# Patient Record
Sex: MALE | Race: WHITE | NOT HISPANIC OR LATINO | Employment: UNEMPLOYED | URBAN - METROPOLITAN AREA
[De-identification: names, ages, dates, MRNs, and addresses within clinical notes are randomized per-mention and may not be internally consistent; named-entity substitution may affect disease eponyms.]

---

## 2023-01-20 PROBLEM — J32.9 SINUSITIS: Status: ACTIVE | Noted: 2023-01-20

## 2023-01-20 PROBLEM — K86.2 PANCREAS CYST (HHS-HCC): Status: ACTIVE | Noted: 2023-01-20

## 2023-01-20 PROBLEM — G44.009 CLUSTER HEADACHE: Status: ACTIVE | Noted: 2023-01-20

## 2023-01-20 PROBLEM — H40.9 GLAUCOMA: Status: ACTIVE | Noted: 2023-01-20

## 2023-01-20 PROBLEM — H93.19 TINNITUS: Status: ACTIVE | Noted: 2023-01-20

## 2023-01-20 PROBLEM — F17.210 NICOTINE DEPENDENCE, CIGARETTES, UNCOMPLICATED: Status: ACTIVE | Noted: 2023-01-20

## 2023-01-20 PROBLEM — K76.9 LIVER LESION, LEFT LOBE: Status: ACTIVE | Noted: 2023-01-20

## 2023-01-20 PROBLEM — F31.9 BIPOLAR DISORDER (MANIC DEPRESSION) (MULTI): Status: ACTIVE | Noted: 2023-01-20

## 2023-01-20 PROBLEM — H35.81 MACULAR EDEMA OF LEFT EYE: Status: ACTIVE | Noted: 2023-01-20

## 2023-01-20 PROBLEM — H52.13 MYOPIA WITH PRESBYOPIA OF BOTH EYES: Status: ACTIVE | Noted: 2023-01-20

## 2023-01-20 PROBLEM — R05.9 COUGH: Status: ACTIVE | Noted: 2023-01-20

## 2023-01-20 PROBLEM — F19.10 DRUG ABUSE (MULTI): Status: ACTIVE | Noted: 2023-01-20

## 2023-01-20 PROBLEM — I82.432 ACUTE DEEP VEIN THROMBOSIS (DVT) OF POPLITEAL VEIN OF LEFT LOWER EXTREMITY (MULTI): Status: ACTIVE | Noted: 2023-01-20

## 2023-01-20 PROBLEM — Z98.83 STATUS POST GLAUCOMA SURGERY: Status: ACTIVE | Noted: 2023-01-20

## 2023-01-20 PROBLEM — H16.223 KERATOCONJUNCTIVITIS SICCA OF BOTH EYES NOT DUE TO SJOGREN'S SYNDROME: Status: ACTIVE | Noted: 2023-01-20

## 2023-01-20 PROBLEM — R51.9 FACIAL PAIN: Status: ACTIVE | Noted: 2023-01-20

## 2023-01-20 PROBLEM — H40.30X0 TRAUMATIC GLAUCOMA: Status: ACTIVE | Noted: 2023-01-20

## 2023-01-20 PROBLEM — N40.0 BPH (BENIGN PROSTATIC HYPERPLASIA): Status: ACTIVE | Noted: 2023-01-20

## 2023-01-20 PROBLEM — Z96.1 BILATERAL PSEUDOPHAKIA: Status: ACTIVE | Noted: 2023-01-20

## 2023-01-20 PROBLEM — H34.8192 HISTORY OF CENTRAL RETINAL VEIN OCCLUSION (CMS-HCC): Status: ACTIVE | Noted: 2023-01-20

## 2023-01-20 PROBLEM — E78.5 HYPERLIPIDEMIA: Status: ACTIVE | Noted: 2023-01-20

## 2023-01-20 PROBLEM — H52.4 MYOPIA WITH PRESBYOPIA OF BOTH EYES: Status: ACTIVE | Noted: 2023-01-20

## 2023-01-20 PROBLEM — I73.00 RAYNAUD PHENOMENON: Status: ACTIVE | Noted: 2023-01-20

## 2023-01-20 PROBLEM — R20.0 NUMBNESS OF FACE: Status: ACTIVE | Noted: 2023-01-20

## 2023-01-20 PROBLEM — I83.819 VARICOSE VEINS WITH PAIN: Status: ACTIVE | Noted: 2023-01-20

## 2023-01-20 PROBLEM — R93.1 ELEVATED CORONARY ARTERY CALCIUM SCORE: Status: ACTIVE | Noted: 2023-01-20

## 2023-01-20 PROBLEM — M79.671 RIGHT FOOT PAIN: Status: ACTIVE | Noted: 2023-01-20

## 2023-01-20 PROBLEM — R51.9 HEADACHE: Status: ACTIVE | Noted: 2023-01-20

## 2023-01-20 PROBLEM — M79.662 PAIN OF LEFT CALF: Status: ACTIVE | Noted: 2023-01-20

## 2023-01-20 PROBLEM — R63.4 WEIGHT LOSS: Status: ACTIVE | Noted: 2023-01-20

## 2023-01-20 PROBLEM — M19.90 ARTHRITIS: Status: ACTIVE | Noted: 2023-01-20

## 2023-01-20 RX ORDER — BRIMONIDINE TARTRATE 1.5 MG/ML
1 SOLUTION/ DROPS OPHTHALMIC 2 TIMES DAILY
COMMUNITY
Start: 2018-09-20

## 2023-01-20 RX ORDER — ATORVASTATIN CALCIUM 80 MG/1
1 TABLET, FILM COATED ORAL DAILY
COMMUNITY
Start: 2018-09-10 | End: 2023-05-15

## 2023-01-20 RX ORDER — DIVALPROEX SODIUM 500 MG/1
1 TABLET, DELAYED RELEASE ORAL 2 TIMES DAILY
COMMUNITY
Start: 2019-08-16 | End: 2023-05-04

## 2023-01-20 RX ORDER — MULTIVITAMIN
TABLET ORAL
COMMUNITY

## 2023-01-20 RX ORDER — TIMOLOL MALEATE 5 MG/ML
1 SOLUTION/ DROPS OPHTHALMIC
COMMUNITY
Start: 2018-09-20

## 2023-03-07 ENCOUNTER — OFFICE VISIT (OUTPATIENT)
Dept: PRIMARY CARE | Facility: CLINIC | Age: 63
End: 2023-03-07
Payer: COMMERCIAL

## 2023-03-07 VITALS
SYSTOLIC BLOOD PRESSURE: 132 MMHG | BODY MASS INDEX: 26.09 KG/M2 | HEIGHT: 67 IN | WEIGHT: 166.2 LBS | TEMPERATURE: 97.3 F | RESPIRATION RATE: 16 BRPM | DIASTOLIC BLOOD PRESSURE: 74 MMHG | HEART RATE: 74 BPM

## 2023-03-07 DIAGNOSIS — F31.60 BIPOLAR AFFECTIVE DISORDER, CURRENT EPISODE MIXED, CURRENT EPISODE SEVERITY UNSPECIFIED (MULTI): Primary | ICD-10-CM

## 2023-03-07 DIAGNOSIS — K86.2 PANCREAS CYST (HHS-HCC): ICD-10-CM

## 2023-03-07 DIAGNOSIS — K59.00 CONSTIPATION, UNSPECIFIED CONSTIPATION TYPE: ICD-10-CM

## 2023-03-07 DIAGNOSIS — Z12.5 PROSTATE CANCER SCREENING: ICD-10-CM

## 2023-03-07 DIAGNOSIS — E78.2 MIXED HYPERLIPIDEMIA: ICD-10-CM

## 2023-03-07 PROBLEM — R05.9 COUGH: Status: RESOLVED | Noted: 2023-01-20 | Resolved: 2023-03-07

## 2023-03-07 PROCEDURE — 99213 OFFICE O/P EST LOW 20 MIN: CPT | Performed by: FAMILY MEDICINE

## 2023-03-07 ASSESSMENT — ENCOUNTER SYMPTOMS
ABDOMINAL PAIN: 1
CONSTIPATION: 1

## 2023-03-07 NOTE — PROGRESS NOTES
"Subjective   Patient ID: Romie Vargas is a 62 y.o. male who presents for Abdominal Pain and Constipation (Patient had constipation Saturday night. Had BM w-loose stool on Sunday ).    HPI     Review of Systems    Objective   /74   Pulse 74   Temp 36.3 °C (97.3 °F)   Resp 16   Ht 1.702 m (5' 7\")   Wt 75.4 kg (166 lb 3.2 oz)   BMI 26.03 kg/m²     Physical Exam    Assessment/Plan          "

## 2023-03-07 NOTE — PROGRESS NOTES
"Subjective   Patient ID: Romie Vargas is a 62 y.o. male who presents for Abdominal Pain and Constipation (Patient had constipation Saturday night. Had BM w-loose stool on Sunday ).  Wants to wait on lung cancer screening   working on cutting down on cigarettes one a month  Abdominal Pain  Associated symptoms include constipation.   Constipation  Associated symptoms include abdominal pain.        Review of Systems   Gastrointestinal:  Positive for abdominal pain and constipation.       Objective   /74   Pulse 74   Temp 36.3 °C (97.3 °F)   Resp 16   Ht 1.702 m (5' 7\")   Wt 75.4 kg (166 lb 3.2 oz)   BMI 26.03 kg/m²     Physical Exam  Vitals and nursing note reviewed.   Constitutional:       General: He is not in acute distress.     Appearance: He is not ill-appearing.   HENT:      Head: Normocephalic and atraumatic.      Mouth/Throat:      Mouth: Mucous membranes are moist.   Eyes:      Conjunctiva/sclera: Conjunctivae normal.   Cardiovascular:      Rate and Rhythm: Normal rate and regular rhythm.      Heart sounds: Normal heart sounds.   Pulmonary:      Effort: Pulmonary effort is normal.      Breath sounds: Normal breath sounds.   Skin:     General: Skin is warm.   Neurological:      Mental Status: He is alert.   Psychiatric:         Mood and Affect: Mood normal.         Thought Content: Thought content normal.         Judgment: Judgment normal.         Assessment/Plan   Diagnoses and all orders for this visit:  Bipolar affective disorder, current episode mixed, current episode severity unspecified (CMS/HCC)  -     Follow Up In [facility] - PCP; Future  Mixed hyperlipidemia  -     CBC  -     Comprehensive Metabolic Panel  -     Lipid Panel  Pancreas cyst  Prostate cancer screening  -     Prostate Specific Antigen  Constipation, unspecified constipation type  Comments:  discussed fiber and increased water.  recent colonscopy last year         "

## 2023-05-04 DIAGNOSIS — F31.9 BIPOLAR DISORDER, UNSPECIFIED (MULTI): ICD-10-CM

## 2023-05-04 RX ORDER — DIVALPROEX SODIUM 500 MG/1
TABLET, DELAYED RELEASE ORAL
Qty: 180 TABLET | Refills: 1 | Status: SHIPPED | OUTPATIENT
Start: 2023-05-04 | End: 2023-10-30

## 2023-05-13 DIAGNOSIS — E78.5 HYPERLIPIDEMIA, UNSPECIFIED: ICD-10-CM

## 2023-05-15 RX ORDER — ATORVASTATIN CALCIUM 80 MG/1
TABLET, FILM COATED ORAL
Qty: 90 TABLET | Refills: 2 | Status: SHIPPED | OUTPATIENT
Start: 2023-05-15 | End: 2024-02-15

## 2023-08-16 ENCOUNTER — APPOINTMENT (OUTPATIENT)
Dept: LAB | Facility: LAB | Age: 63
End: 2023-08-16
Payer: COMMERCIAL

## 2023-08-16 LAB
ALANINE AMINOTRANSFERASE (SGPT) (U/L) IN SER/PLAS: 26 U/L (ref 10–52)
ALBUMIN (G/DL) IN SER/PLAS: 4.2 G/DL (ref 3.4–5)
ALKALINE PHOSPHATASE (U/L) IN SER/PLAS: 68 U/L (ref 33–136)
ANION GAP IN SER/PLAS: 11 MMOL/L (ref 10–20)
ASPARTATE AMINOTRANSFERASE (SGOT) (U/L) IN SER/PLAS: 21 U/L (ref 9–39)
BILIRUBIN TOTAL (MG/DL) IN SER/PLAS: 0.6 MG/DL (ref 0–1.2)
CALCIUM (MG/DL) IN SER/PLAS: 9.6 MG/DL (ref 8.6–10.3)
CARBON DIOXIDE, TOTAL (MMOL/L) IN SER/PLAS: 28 MMOL/L (ref 21–32)
CHLORIDE (MMOL/L) IN SER/PLAS: 101 MMOL/L (ref 98–107)
CHOLESTEROL (MG/DL) IN SER/PLAS: 129 MG/DL (ref 0–199)
CHOLESTEROL IN HDL (MG/DL) IN SER/PLAS: 44.5 MG/DL
CHOLESTEROL/HDL RATIO: 2.9
CREATININE (MG/DL) IN SER/PLAS: 1.03 MG/DL (ref 0.5–1.3)
ERYTHROCYTE DISTRIBUTION WIDTH (RATIO) BY AUTOMATED COUNT: 12.7 % (ref 11.5–14.5)
ERYTHROCYTE MEAN CORPUSCULAR HEMOGLOBIN CONCENTRATION (G/DL) BY AUTOMATED: 34.5 G/DL (ref 32–36)
ERYTHROCYTE MEAN CORPUSCULAR VOLUME (FL) BY AUTOMATED COUNT: 90 FL (ref 80–100)
ERYTHROCYTES (10*6/UL) IN BLOOD BY AUTOMATED COUNT: 4.97 X10E12/L (ref 4.5–5.9)
GFR MALE: 81 ML/MIN/1.73M2
GLUCOSE (MG/DL) IN SER/PLAS: 100 MG/DL (ref 74–99)
HEMATOCRIT (%) IN BLOOD BY AUTOMATED COUNT: 44.9 % (ref 41–52)
HEMOGLOBIN (G/DL) IN BLOOD: 15.5 G/DL (ref 13.5–17.5)
LDL: 67 MG/DL (ref 0–99)
LEUKOCYTES (10*3/UL) IN BLOOD BY AUTOMATED COUNT: 8.7 X10E9/L (ref 4.4–11.3)
NRBC (PER 100 WBCS) BY AUTOMATED COUNT: 0 /100 WBC (ref 0–0)
PLATELETS (10*3/UL) IN BLOOD AUTOMATED COUNT: 254 X10E9/L (ref 150–450)
POTASSIUM (MMOL/L) IN SER/PLAS: 4.3 MMOL/L (ref 3.5–5.3)
PROSTATE SPECIFIC AG (NG/ML) IN SER/PLAS: 0.99 NG/ML (ref 0–4)
PROTEIN TOTAL: 6.6 G/DL (ref 6.4–8.2)
SODIUM (MMOL/L) IN SER/PLAS: 136 MMOL/L (ref 136–145)
TRIGLYCERIDE (MG/DL) IN SER/PLAS: 89 MG/DL (ref 0–149)
UREA NITROGEN (MG/DL) IN SER/PLAS: 12 MG/DL (ref 6–23)
VLDL: 18 MG/DL (ref 0–40)

## 2023-08-29 ENCOUNTER — OFFICE VISIT (OUTPATIENT)
Dept: PRIMARY CARE | Facility: CLINIC | Age: 63
End: 2023-08-29
Payer: COMMERCIAL

## 2023-08-29 VITALS
RESPIRATION RATE: 16 BRPM | HEIGHT: 67 IN | TEMPERATURE: 97.5 F | WEIGHT: 167.2 LBS | SYSTOLIC BLOOD PRESSURE: 128 MMHG | HEART RATE: 78 BPM | DIASTOLIC BLOOD PRESSURE: 76 MMHG | BODY MASS INDEX: 26.24 KG/M2

## 2023-08-29 DIAGNOSIS — Z00.00 ROUTINE GENERAL MEDICAL EXAMINATION AT A HEALTH CARE FACILITY: Primary | ICD-10-CM

## 2023-08-29 DIAGNOSIS — F31.60 BIPOLAR AFFECTIVE DISORDER, CURRENT EPISODE MIXED, CURRENT EPISODE SEVERITY UNSPECIFIED (MULTI): ICD-10-CM

## 2023-08-29 PROCEDURE — 99396 PREV VISIT EST AGE 40-64: CPT | Performed by: FAMILY MEDICINE

## 2023-08-29 NOTE — PROGRESS NOTES
"Subjective   Patient ID: Lewis Vargas is a 63 y.o. male who presents for Annual Exam (Patients BW results in chart for review ).    Subjective  Reason for Visit: Romie Vargas is an 63 y.o. male here for a Wellness visit.   Any problems: No  Less 20 pack year history down to 10 cigarettes a day   Past Medical, Surgical, and Family History reviewed and updated in chart.    Reviewed all medications by prescribing practitioner or clinical pharmacist (such as prescriptions, OTCs, herbal therapies and supplements) and documented in the medical record.    Colon cancer screening: yes  Prostate cancer screening: Yes}  Dentist: Yes  Vitamins: Yes  Exercise: Yes, describe: motorcycle walk  Dermatologist: No  OptometristYes:   Immunizations up to date: No, Describe will get when on Medicare           Review of Systems    Objective   /76   Pulse 78   Temp 36.4 °C (97.5 °F)   Resp 16   Ht 1.702 m (5' 7\")   Wt 75.8 kg (167 lb 3.2 oz)   BMI 26.19 kg/m²     Physical Exam  Vitals and nursing note reviewed.   Constitutional:       General: He is not in acute distress.     Appearance: Normal appearance.   HENT:      Head: Normocephalic and atraumatic.      Right Ear: Tympanic membrane, ear canal and external ear normal.      Left Ear: Tympanic membrane, ear canal and external ear normal.      Nose: Nose normal.      Mouth/Throat:      Mouth: Mucous membranes are moist.      Pharynx: Oropharynx is clear.   Eyes:      Conjunctiva/sclera: Conjunctivae normal.   Neck:      Thyroid: No thyroid mass or thyromegaly.   Cardiovascular:      Rate and Rhythm: Normal rate and regular rhythm.      Pulses: Normal pulses.      Heart sounds: Normal heart sounds.   Pulmonary:      Effort: Pulmonary effort is normal.      Breath sounds: Normal breath sounds.   Abdominal:      General: Abdomen is flat. There is no distension.      Palpations: Abdomen is soft. There is no mass.      Tenderness: There is no abdominal tenderness.      " Hernia: There is no hernia in the left inguinal area or right inguinal area.   Genitourinary:     Penis: Normal.       Testes: Normal.      Epididymis:      Right: Normal.      Left: Normal.      Prostate: Normal.   Musculoskeletal:         General: Normal range of motion.      Cervical back: Normal range of motion and neck supple.   Lymphadenopathy:      Cervical: No cervical adenopathy.      Lower Body: No right inguinal adenopathy. No left inguinal adenopathy.   Skin:     General: Skin is warm.      Findings: No rash.   Neurological:      General: No focal deficit present.      Mental Status: He is alert and oriented to person, place, and time.   Psychiatric:         Mood and Affect: Mood normal.         Behavior: Behavior normal.         Thought Content: Thought content normal.         Judgment: Judgment normal.         Assessment/Plan   Problem List Items Addressed This Visit       Bipolar disorder (manic depression) (CMS/[facility])     Other Visit Diagnoses       Routine general medical examination at a health care facility    -  Primary    Relevant Orders    Follow Up In [facility] - PCP - Health Maintenance

## 2023-10-28 DIAGNOSIS — F31.9 BIPOLAR DISORDER, UNSPECIFIED (MULTI): ICD-10-CM

## 2023-10-30 RX ORDER — DIVALPROEX SODIUM 500 MG/1
500 TABLET, DELAYED RELEASE ORAL 2 TIMES DAILY
Qty: 180 TABLET | Refills: 1 | Status: SHIPPED | OUTPATIENT
Start: 2023-10-30 | End: 2024-04-25

## 2024-02-15 DIAGNOSIS — E78.5 HYPERLIPIDEMIA, UNSPECIFIED: ICD-10-CM

## 2024-02-15 RX ORDER — ATORVASTATIN CALCIUM 80 MG/1
TABLET, FILM COATED ORAL
Qty: 90 TABLET | Refills: 2 | Status: SHIPPED | OUTPATIENT
Start: 2024-02-15

## 2024-04-23 ENCOUNTER — OFFICE VISIT (OUTPATIENT)
Dept: CARDIOLOGY | Facility: CLINIC | Age: 64
End: 2024-04-23
Payer: COMMERCIAL

## 2024-04-23 VITALS
HEART RATE: 68 BPM | HEIGHT: 67 IN | OXYGEN SATURATION: 98 % | BODY MASS INDEX: 25.58 KG/M2 | SYSTOLIC BLOOD PRESSURE: 130 MMHG | DIASTOLIC BLOOD PRESSURE: 80 MMHG | WEIGHT: 163 LBS

## 2024-04-23 DIAGNOSIS — R93.1 ELEVATED CORONARY ARTERY CALCIUM SCORE: Primary | ICD-10-CM

## 2024-04-23 DIAGNOSIS — R94.31 ABNORMAL EKG: ICD-10-CM

## 2024-04-23 DIAGNOSIS — E78.5 HYPERLIPIDEMIA, UNSPECIFIED HYPERLIPIDEMIA TYPE: ICD-10-CM

## 2024-04-23 PROCEDURE — 99214 OFFICE O/P EST MOD 30 MIN: CPT | Performed by: INTERNAL MEDICINE

## 2024-04-23 PROCEDURE — 93000 ELECTROCARDIOGRAM COMPLETE: CPT | Performed by: INTERNAL MEDICINE

## 2024-04-23 NOTE — PROGRESS NOTES
"Chief Complaint:   No chief complaint on file.     History Of Present Illness:    Romie Vargas \"Shiv" is a 63 y.o. male with a history of dyslipidemia, abnormal ECG (nonspecific inferolateral ST/T changes), and elevated coronary calcium score here for follow-up.     He continues to walk every day. Denies any exertional CP or SOB.    Is still smoking about 1/2 pack of cigarettes daily.  Is still trying to cut down.    He wore a Guardian's hat.  He tells me that he is not a fan but his wife is a huge fan.    He told me that he was driving to [place] walk[place].  He had a bump in the road and now his tires are making a noise.  He just had new tires placed last week.    He is getting ready to go onto Medicare.    Lexiscan nuclear stress test 5/22/2023: No evidence of ischemia or scarring.  EF 62%.     CT coronary calcium score 9/17/2021: Total score 1145.   LM 17    LCx 4.5        Past Medical History:  He has a past medical history of Bipolar disorder, unspecified (Multi) (02/23/2022), Bipolar disorder, unspecified (Multi) (02/23/2022), Bipolar disorder, unspecified (Multi) (02/23/2022), Cluster headache syndrome, unspecified, not intractable (06/03/2021), Glaucoma secondary to eye trauma, left eye, stage unspecified (06/16/2021), Glaucoma secondary to eye trauma, unspecified eye, stage unspecified (02/25/2021), Personal history of other diseases of the nervous system and sense organs (07/22/2019), Unspecified glaucoma (02/25/2021), Unspecified glaucoma (02/25/2021), and Unspecified osteoarthritis, unspecified site (01/13/2021).    Past Surgical History:  He has a past surgical history that includes Other surgical history (07/22/2019); Other surgical history (07/22/2019); and Other surgical history (10/20/2021).      Social History:  He reports that he has been smoking cigarettes. He has never used smokeless tobacco. He reports that he does not currently use alcohol. He reports that he " "does not use drugs.    Family History:  Family History   Problem Relation Name Age of Onset    Other (diabetes mellitus) Mother      Dementia Father      Other (cardiac disorder) Father      Other (malignant neoplasm of stomach) Father          Allergies:  Patient has no known allergies.    Outpatient Medications:  Current Outpatient Medications   Medication Instructions    atorvastatin (Lipitor) 80 mg tablet TAKE 1 TABLET BY MOUTH EVERY DAY    brimonidine (AlphaGAN P) 0.15 % ophthalmic solution 1 drop, Left Eye, 2 times daily    CALCIUM POLYCARBOPHIL ORAL Fiber TABS   Refills: 0       Active    divalproex (DEPAKOTE) 500 mg, oral, 2 times daily    multivitamin tablet Multi Vitamin Daily TABS   Refills: 0       Active    timolol (Timoptic) 0.5 % ophthalmic solution 1 drop, Both Eyes, Use as directed       Last Recorded Vitals:  Visit Vitals  /80 (BP Location: Left arm, Patient Position: Sitting)   Pulse 68   Ht 1.702 m (5' 7\")   Wt 73.9 kg (163 lb)   SpO2 98%   BMI 25.53 kg/m²   Smoking Status Every Day   BSA 1.87 m²      LASTWT(3):   Wt Readings from Last 3 Encounters:   04/23/24 73.9 kg (163 lb)   08/29/23 75.8 kg (167 lb 3.2 oz)   04/25/23 75.8 kg (167 lb)       Physical Exam:  In general: alert and in no acute distress.   HEENT: Carotid upstrokes normal with no bruits. JVP is normal.   Pulmonary: Clear to auscultation bilaterally.  Cardiovascular: S1,S2, regular. No appreciable murmurs, rubs or gallops.   Lower extremities: Warm. 2+ distal pulses. No edema.     Last Labs:  CBC -  Recent Labs     08/16/23  1004 08/08/22  1027 08/11/21  0907   WBC 8.7 7.5 7.0   HGB 15.5 15.7 14.7   HCT 44.9 46.6 43.5    282 253   MCV 90 92 92       CMP -  Recent Labs     08/16/23  1004 08/08/22  1027 08/11/21  0907    139 139   K 4.3 4.6 4.5    104 105   CO2 28 28 29   ANIONGAP 11 12 10   BUN 12 18 10   CREATININE 1.03 1.22 1.03     Recent Labs     08/16/23  1004 08/08/22  1027 08/11/21  0907   ALBUMIN 4.2 " "4.3 4.1   ALKPHOS 68 60 55   ALT 26 33 23   AST 21 29 20   BILITOT 0.6 0.5 0.5       LIPID PANEL -   Recent Labs     08/16/23  1004 08/08/22  1027 08/11/21  0907   CHOL 129 122 130   LDLF 67 62 69   HDL 44.5 46.0 42.0   TRIG 89 71 94       No results for input(s): \"BNP\", \"HGBA1C\" in the last 70178 hours.        Assessment/Plan   1) elevated coronary calcium score: Nuclear stress test May 2023 (performed because of inferolateral T wave inversions) demonstrated low likelihood of flow-limiting coronary disease.  Continue with lifestyle risk factor modification.     2) tobacco abuse: Encourage him to decrease his cigarette consumption.    3) dyslipidemia: LDL less than 70.  Continue current medical therapy.     4) abnormal ECG: Unchanged from prior ECGs.     5) follow-up: 12 months or sooner if needed.       Spencer Kemp MD  "

## 2024-04-25 DIAGNOSIS — F31.9 BIPOLAR DISORDER, UNSPECIFIED (MULTI): ICD-10-CM

## 2024-04-25 RX ORDER — DIVALPROEX SODIUM 500 MG/1
500 TABLET, DELAYED RELEASE ORAL 2 TIMES DAILY
Qty: 60 TABLET | Refills: 0 | Status: SHIPPED | OUTPATIENT
Start: 2024-04-25 | End: 2024-05-24

## 2024-05-24 DIAGNOSIS — F31.9 BIPOLAR DISORDER, UNSPECIFIED (MULTI): ICD-10-CM

## 2024-05-24 RX ORDER — DIVALPROEX SODIUM 500 MG/1
500 TABLET, DELAYED RELEASE ORAL 2 TIMES DAILY
Qty: 180 TABLET | Refills: 1 | Status: SHIPPED | OUTPATIENT
Start: 2024-05-24

## 2024-06-11 ENCOUNTER — APPOINTMENT (OUTPATIENT)
Dept: PRIMARY CARE | Facility: CLINIC | Age: 64
End: 2024-06-11
Payer: COMMERCIAL

## 2024-08-13 ENCOUNTER — APPOINTMENT (OUTPATIENT)
Dept: PRIMARY CARE | Facility: CLINIC | Age: 64
End: 2024-08-13
Payer: COMMERCIAL

## 2024-08-13 VITALS
WEIGHT: 164.2 LBS | RESPIRATION RATE: 16 BRPM | OXYGEN SATURATION: 96 % | DIASTOLIC BLOOD PRESSURE: 70 MMHG | SYSTOLIC BLOOD PRESSURE: 112 MMHG | TEMPERATURE: 98 F | HEART RATE: 78 BPM | BODY MASS INDEX: 25.77 KG/M2 | HEIGHT: 67 IN

## 2024-08-13 DIAGNOSIS — H40.33X0 GLAUCOMA ASSOCIATED WITH OCULAR TRAUMA OF BOTH EYES, UNSPECIFIED GLAUCOMA STAGE: ICD-10-CM

## 2024-08-13 DIAGNOSIS — R35.0 BENIGN PROSTATIC HYPERPLASIA WITH URINARY FREQUENCY: ICD-10-CM

## 2024-08-13 DIAGNOSIS — K76.9 LIVER LESION, LEFT LOBE: ICD-10-CM

## 2024-08-13 DIAGNOSIS — S05.90XA GLAUCOMA ASSOCIATED WITH OCULAR TRAUMA OF BOTH EYES, UNSPECIFIED GLAUCOMA STAGE: ICD-10-CM

## 2024-08-13 DIAGNOSIS — Z00.00 WELL ADULT HEALTH CHECK: ICD-10-CM

## 2024-08-13 DIAGNOSIS — Z12.5 ENCOUNTER FOR PROSTATE CANCER SCREENING: ICD-10-CM

## 2024-08-13 DIAGNOSIS — K86.2 PANCREAS CYST (HHS-HCC): ICD-10-CM

## 2024-08-13 DIAGNOSIS — I82.432 ACUTE DEEP VEIN THROMBOSIS (DVT) OF POPLITEAL VEIN OF LEFT LOWER EXTREMITY (MULTI): ICD-10-CM

## 2024-08-13 DIAGNOSIS — F17.210 CIGARETTE NICOTINE DEPENDENCE WITHOUT COMPLICATION: ICD-10-CM

## 2024-08-13 DIAGNOSIS — Z23 IMMUNIZATION DUE: ICD-10-CM

## 2024-08-13 DIAGNOSIS — N40.1 BENIGN PROSTATIC HYPERPLASIA WITH URINARY FREQUENCY: ICD-10-CM

## 2024-08-13 DIAGNOSIS — Z11.4 ENCOUNTER FOR SCREENING FOR HIV: ICD-10-CM

## 2024-08-13 DIAGNOSIS — F31.60 BIPOLAR AFFECTIVE DISORDER, CURRENT EPISODE MIXED, CURRENT EPISODE SEVERITY UNSPECIFIED (MULTI): ICD-10-CM

## 2024-08-13 DIAGNOSIS — Z00.00 ROUTINE GENERAL MEDICAL EXAMINATION AT HEALTH CARE FACILITY: Primary | ICD-10-CM

## 2024-08-13 PROBLEM — F19.10 DRUG ABUSE (MULTI): Status: RESOLVED | Noted: 2023-01-20 | Resolved: 2024-08-13

## 2024-08-13 PROBLEM — H34.8192 HISTORY OF CENTRAL RETINAL VEIN OCCLUSION (CMS-HCC): Status: RESOLVED | Noted: 2023-01-20 | Resolved: 2024-08-13

## 2024-08-13 PROBLEM — J32.9 SINUSITIS: Status: RESOLVED | Noted: 2023-01-20 | Resolved: 2024-08-13

## 2024-08-13 PROCEDURE — 90472 IMMUNIZATION ADMIN EACH ADD: CPT | Performed by: INTERNAL MEDICINE

## 2024-08-13 PROCEDURE — 1170F FXNL STATUS ASSESSED: CPT | Performed by: INTERNAL MEDICINE

## 2024-08-13 PROCEDURE — G0009 ADMIN PNEUMOCOCCAL VACCINE: HCPCS | Performed by: INTERNAL MEDICINE

## 2024-08-13 PROCEDURE — 1124F ACP DISCUSS-NO DSCNMKR DOCD: CPT | Performed by: INTERNAL MEDICINE

## 2024-08-13 PROCEDURE — G0010 ADMIN HEPATITIS B VACCINE: HCPCS | Performed by: INTERNAL MEDICINE

## 2024-08-13 PROCEDURE — G0402 INITIAL PREVENTIVE EXAM: HCPCS | Performed by: INTERNAL MEDICINE

## 2024-08-13 PROCEDURE — 99214 OFFICE O/P EST MOD 30 MIN: CPT | Performed by: INTERNAL MEDICINE

## 2024-08-13 PROCEDURE — 1126F AMNT PAIN NOTED NONE PRSNT: CPT | Performed by: INTERNAL MEDICINE

## 2024-08-13 PROCEDURE — 3008F BODY MASS INDEX DOCD: CPT | Performed by: INTERNAL MEDICINE

## 2024-08-13 PROCEDURE — 1159F MED LIST DOCD IN RCRD: CPT | Performed by: INTERNAL MEDICINE

## 2024-08-13 PROCEDURE — 90632 HEPA VACCINE ADULT IM: CPT | Performed by: INTERNAL MEDICINE

## 2024-08-13 PROCEDURE — 90715 TDAP VACCINE 7 YRS/> IM: CPT | Performed by: INTERNAL MEDICINE

## 2024-08-13 PROCEDURE — 90677 PCV20 VACCINE IM: CPT | Performed by: INTERNAL MEDICINE

## 2024-08-13 PROCEDURE — 1160F RVW MEDS BY RX/DR IN RCRD: CPT | Performed by: INTERNAL MEDICINE

## 2024-08-13 PROCEDURE — 90739 HEPB VACC 2/4 DOSE ADULT IM: CPT | Performed by: INTERNAL MEDICINE

## 2024-08-13 PROCEDURE — 99406 BEHAV CHNG SMOKING 3-10 MIN: CPT | Performed by: INTERNAL MEDICINE

## 2024-08-13 ASSESSMENT — ACTIVITIES OF DAILY LIVING (ADL)
DRESSING: INDEPENDENT
DOING_HOUSEWORK: INDEPENDENT
DOING_HOUSEWORK: INDEPENDENT
TAKING_MEDICATION: INDEPENDENT
DRESSING: INDEPENDENT
MANAGING_FINANCES: INDEPENDENT
TAKING_MEDICATION: INDEPENDENT
BATHING: INDEPENDENT
BATHING: INDEPENDENT
GROCERY_SHOPPING: INDEPENDENT
GROCERY_SHOPPING: INDEPENDENT
MANAGING_FINANCES: INDEPENDENT

## 2024-08-13 ASSESSMENT — PATIENT HEALTH QUESTIONNAIRE - PHQ9
2. FEELING DOWN, DEPRESSED OR HOPELESS: NOT AT ALL
1. LITTLE INTEREST OR PLEASURE IN DOING THINGS: NOT AT ALL
SUM OF ALL RESPONSES TO PHQ9 QUESTIONS 1 AND 2: 0

## 2024-08-13 ASSESSMENT — ENCOUNTER SYMPTOMS
LOSS OF SENSATION IN FEET: 0
OCCASIONAL FEELINGS OF UNSTEADINESS: 0
DEPRESSION: 0

## 2024-08-13 ASSESSMENT — PAIN SCALES - GENERAL: PAINLEVEL: 0-NO PAIN

## 2024-08-13 NOTE — ASSESSMENT & PLAN NOTE
Doing well overall and was prescribed Depakote and is no longer seeing psychologist or psychiatry.

## 2024-08-13 NOTE — PROGRESS NOTES
"Subjective   Reason for Visit: Romie Vargas is an 64 y.o. male here for a Medicare Wellness visit.     Past Medical, Surgical, and Family History reviewed and updated in chart.         Patient does not have a living will or POA   Colonoscopy: 3.17.2022  PSA: 8.16.2023  Patient states that he does not really drink water, drinks more coffee and Gatorade throughout the day     Discussed smoking cessation and options available for smoking cessation.  Time spent 5 minutes    Patient Care Team:  Nemesio Beasley DO as PCP - General (Internal Medicine)  Nemesio Beasley DO as PCP - [employer] PCP     Review of Systems   All other systems reviewed and are negative.      Objective   Vitals:  /70   Pulse 78   Temp 36.7 °C (98 °F)   Resp 16   Ht 1.702 m (5' 7\")   Wt 74.5 kg (164 lb 3.2 oz)   SpO2 96%   BMI 25.72 kg/m²       Physical Exam  Constitutional:       Appearance: Normal appearance.   HENT:      Head: Normocephalic.      Right Ear: Tympanic membrane, ear canal and external ear normal.      Left Ear: Tympanic membrane, ear canal and external ear normal.      Nose: Nose normal.      Mouth/Throat:      Mouth: Mucous membranes are moist.      Pharynx: Oropharynx is clear.   Eyes:      Conjunctiva/sclera: Conjunctivae normal.   Cardiovascular:      Rate and Rhythm: Normal rate and regular rhythm.   Pulmonary:      Effort: Pulmonary effort is normal.      Breath sounds: Normal breath sounds.   Musculoskeletal:         General: Normal range of motion.      Cervical back: Neck supple.   Skin:     General: Skin is warm and dry.   Neurological:      General: No focal deficit present.      Mental Status: He is alert and oriented to person, place, and time.   Psychiatric:         Mood and Affect: Mood normal.         Assessment/Plan   Problem List Items Addressed This Visit             ICD-10-CM    Acute deep vein thrombosis (DVT) of popliteal vein of left lower extremity (Multi) I82.432    " Relevant Orders    CBC    Bipolar disorder (manic depression) (Multi) F31.9     Doing well overall and was prescribed Depakote and is no longer seeing psychologist or psychiatry.           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    Relevant Orders    CBC     Other Visit Diagnoses         Codes    Routine general medical examination at health care facility    -  Primary Z00.00    Well adult health check     Z00.00    Relevant Orders    Comprehensive Metabolic Panel    CBC    Lipid Panel    TSH with reflex to Free T4 if abnormal    Hepatitis C Antibody    Hepatitis B vaccine, 18yrs and older (HEPLISAV) (Completed)    Encounter for prostate cancer screening     Z12.5    Relevant Orders    CBC    Prostate Specific Antigen    Encounter for screening for HIV     Z11.4    Relevant Orders    CBC    HIV 1/2 Antigen/Antibody Screen with Reflex to Confirmation    Cigarette nicotine dependence without complication     F17.210    Relevant Orders    CBC    CT lung screening low dose    Immunization due     Z23    Relevant Orders    CBC    Tdap vaccine, age 7 years and older (Completed)    Pneumococcal conjugate vaccine, 20-valent (PREVNAR 20) (Completed)          This is a  patient of Dr. Dupont, transferring care to me.  Past medical history reviewed, and discussed with patient, Medications reviewed and renewed as needed, and Gaps in Care addressed as time allowed.              no

## 2024-09-03 ENCOUNTER — LAB (OUTPATIENT)
Dept: LAB | Facility: LAB | Age: 64
End: 2024-09-03
Payer: MEDICARE

## 2024-09-03 ENCOUNTER — HOSPITAL ENCOUNTER (OUTPATIENT)
Dept: RADIOLOGY | Facility: CLINIC | Age: 64
Discharge: HOME | End: 2024-09-03
Payer: MEDICARE

## 2024-09-03 DIAGNOSIS — S05.90XA GLAUCOMA ASSOCIATED WITH OCULAR TRAUMA OF BOTH EYES, UNSPECIFIED GLAUCOMA STAGE: ICD-10-CM

## 2024-09-03 DIAGNOSIS — Z12.5 ENCOUNTER FOR PROSTATE CANCER SCREENING: ICD-10-CM

## 2024-09-03 DIAGNOSIS — Z11.4 ENCOUNTER FOR SCREENING FOR HIV: ICD-10-CM

## 2024-09-03 DIAGNOSIS — F17.210 CIGARETTE NICOTINE DEPENDENCE WITHOUT COMPLICATION: ICD-10-CM

## 2024-09-03 DIAGNOSIS — K76.9 LIVER LESION, LEFT LOBE: ICD-10-CM

## 2024-09-03 DIAGNOSIS — I82.432 ACUTE DEEP VEIN THROMBOSIS (DVT) OF POPLITEAL VEIN OF LEFT LOWER EXTREMITY (MULTI): ICD-10-CM

## 2024-09-03 DIAGNOSIS — H40.33X0 GLAUCOMA ASSOCIATED WITH OCULAR TRAUMA OF BOTH EYES, UNSPECIFIED GLAUCOMA STAGE: ICD-10-CM

## 2024-09-03 DIAGNOSIS — K86.2 PANCREAS CYST (HHS-HCC): ICD-10-CM

## 2024-09-03 DIAGNOSIS — N40.1 BENIGN PROSTATIC HYPERPLASIA WITH URINARY FREQUENCY: ICD-10-CM

## 2024-09-03 DIAGNOSIS — R35.0 BENIGN PROSTATIC HYPERPLASIA WITH URINARY FREQUENCY: ICD-10-CM

## 2024-09-03 DIAGNOSIS — F31.60 BIPOLAR AFFECTIVE DISORDER, CURRENT EPISODE MIXED, CURRENT EPISODE SEVERITY UNSPECIFIED (MULTI): ICD-10-CM

## 2024-09-03 DIAGNOSIS — Z23 IMMUNIZATION DUE: ICD-10-CM

## 2024-09-03 DIAGNOSIS — Z00.00 WELL ADULT HEALTH CHECK: ICD-10-CM

## 2024-09-03 LAB
ALBUMIN SERPL BCP-MCNC: 4.2 G/DL (ref 3.4–5)
ALP SERPL-CCNC: 72 U/L (ref 33–136)
ALT SERPL W P-5'-P-CCNC: 27 U/L (ref 10–52)
ANION GAP SERPL CALC-SCNC: 11 MMOL/L (ref 10–20)
AST SERPL W P-5'-P-CCNC: 22 U/L (ref 9–39)
BILIRUB SERPL-MCNC: 0.5 MG/DL (ref 0–1.2)
BUN SERPL-MCNC: 12 MG/DL (ref 6–23)
CALCIUM SERPL-MCNC: 9.8 MG/DL (ref 8.6–10.6)
CHLORIDE SERPL-SCNC: 103 MMOL/L (ref 98–107)
CHOLEST SERPL-MCNC: 141 MG/DL (ref 0–199)
CHOLESTEROL/HDL RATIO: 2.9
CO2 SERPL-SCNC: 28 MMOL/L (ref 21–32)
CREAT SERPL-MCNC: 0.97 MG/DL (ref 0.5–1.3)
EGFRCR SERPLBLD CKD-EPI 2021: 87 ML/MIN/1.73M*2
ERYTHROCYTE [DISTWIDTH] IN BLOOD BY AUTOMATED COUNT: 13.1 % (ref 11.5–14.5)
GLUCOSE SERPL-MCNC: 88 MG/DL (ref 74–99)
HCT VFR BLD AUTO: 44.6 % (ref 41–52)
HCV AB SER QL: NONREACTIVE
HDLC SERPL-MCNC: 48.6 MG/DL
HGB BLD-MCNC: 15.5 G/DL (ref 13.5–17.5)
HIV 1+2 AB+HIV1 P24 AG SERPL QL IA: NONREACTIVE
LDLC SERPL CALC-MCNC: 74 MG/DL
MCH RBC QN AUTO: 31.5 PG (ref 26–34)
MCHC RBC AUTO-ENTMCNC: 34.8 G/DL (ref 32–36)
MCV RBC AUTO: 91 FL (ref 80–100)
NON HDL CHOLESTEROL: 92 MG/DL (ref 0–149)
NRBC BLD-RTO: 0 /100 WBCS (ref 0–0)
PLATELET # BLD AUTO: 299 X10*3/UL (ref 150–450)
POTASSIUM SERPL-SCNC: 4.7 MMOL/L (ref 3.5–5.3)
PROT SERPL-MCNC: 6.6 G/DL (ref 6.4–8.2)
PSA SERPL-MCNC: 1.19 NG/ML
RBC # BLD AUTO: 4.92 X10*6/UL (ref 4.5–5.9)
SODIUM SERPL-SCNC: 137 MMOL/L (ref 136–145)
TRIGL SERPL-MCNC: 92 MG/DL (ref 0–149)
TSH SERPL-ACNC: 3.3 MIU/L (ref 0.44–3.98)
VLDL: 18 MG/DL (ref 0–40)
WBC # BLD AUTO: 7.9 X10*3/UL (ref 4.4–11.3)

## 2024-09-03 PROCEDURE — 36415 COLL VENOUS BLD VENIPUNCTURE: CPT

## 2024-09-03 PROCEDURE — 71271 CT THORAX LUNG CANCER SCR C-: CPT

## 2024-09-04 ENCOUNTER — TELEPHONE (OUTPATIENT)
Dept: PRIMARY CARE | Facility: CLINIC | Age: 64
End: 2024-09-04
Payer: MEDICARE

## 2024-09-04 NOTE — TELEPHONE ENCOUNTER
----- Message from Nemesio Beasley sent at 9/4/2024  8:14 AM EDT -----  Please call Lewis Vargas with the following information:  Your CT lung cancer screening was done to screen for lung cancer, or to follow lung nodules for progression that would increase your risk of developing lung cancer due to a significant smoking history.     The findings shows emphysema and some pulmonary nodule or nodules, seen with previous CT Calcium scoring, and recommend follow up CT scan in 12 months to make sure that that there is no change.          We can review the details in greater detail at your next scheduled appointment.  Let me know if you have any other questions.

## 2024-09-04 NOTE — TELEPHONE ENCOUNTER
----- Message from Nemesio Beasley sent at 9/4/2024  8:14 AM EDT -----  Please let Lewis Vargas know all results are normal.

## 2024-11-11 ENCOUNTER — APPOINTMENT (OUTPATIENT)
Dept: PRIMARY CARE | Facility: CLINIC | Age: 64
End: 2024-11-11
Payer: MEDICARE

## 2024-11-11 VITALS
DIASTOLIC BLOOD PRESSURE: 64 MMHG | WEIGHT: 167 LBS | HEART RATE: 79 BPM | BODY MASS INDEX: 26.21 KG/M2 | TEMPERATURE: 97.8 F | OXYGEN SATURATION: 98 % | SYSTOLIC BLOOD PRESSURE: 110 MMHG | RESPIRATION RATE: 16 BRPM | HEIGHT: 67 IN

## 2024-11-11 DIAGNOSIS — F17.210 NICOTINE DEPENDENCE, CIGARETTES, UNCOMPLICATED: ICD-10-CM

## 2024-11-11 DIAGNOSIS — F17.210 CIGARETTE NICOTINE DEPENDENCE WITHOUT COMPLICATION: ICD-10-CM

## 2024-11-11 DIAGNOSIS — Z23 IMMUNIZATION DUE: ICD-10-CM

## 2024-11-11 DIAGNOSIS — R93.1 ELEVATED CORONARY ARTERY CALCIUM SCORE: ICD-10-CM

## 2024-11-11 DIAGNOSIS — F31.60 BIPOLAR AFFECTIVE DISORDER, CURRENT EPISODE MIXED, CURRENT EPISODE SEVERITY UNSPECIFIED (MULTI): ICD-10-CM

## 2024-11-11 DIAGNOSIS — E78.2 MIXED HYPERLIPIDEMIA: Primary | ICD-10-CM

## 2024-11-11 PROBLEM — R20.0 NUMBNESS OF FACE: Status: RESOLVED | Noted: 2023-01-20 | Resolved: 2024-11-11

## 2024-11-11 PROBLEM — M79.671 RIGHT FOOT PAIN: Status: RESOLVED | Noted: 2023-01-20 | Resolved: 2024-11-11

## 2024-11-11 PROBLEM — R51.9 FACIAL PAIN: Status: RESOLVED | Noted: 2023-01-20 | Resolved: 2024-11-11

## 2024-11-11 PROBLEM — M79.662 PAIN OF LEFT CALF: Status: RESOLVED | Noted: 2023-01-20 | Resolved: 2024-11-11

## 2024-11-11 PROBLEM — R51.9 HEADACHE: Status: RESOLVED | Noted: 2023-01-20 | Resolved: 2024-11-11

## 2024-11-11 PROBLEM — Z86.718 HISTORY OF DEEP VEIN THROMBOSIS OF LOWER EXTREMITY: Status: ACTIVE | Noted: 2023-01-20

## 2024-11-11 PROCEDURE — 3008F BODY MASS INDEX DOCD: CPT | Performed by: INTERNAL MEDICINE

## 2024-11-11 PROCEDURE — 1123F ACP DISCUSS/DSCN MKR DOCD: CPT | Performed by: INTERNAL MEDICINE

## 2024-11-11 PROCEDURE — G0010 ADMIN HEPATITIS B VACCINE: HCPCS | Performed by: INTERNAL MEDICINE

## 2024-11-11 PROCEDURE — G0008 ADMIN INFLUENZA VIRUS VAC: HCPCS | Performed by: INTERNAL MEDICINE

## 2024-11-11 PROCEDURE — 1158F ADVNC CARE PLAN TLK DOCD: CPT | Performed by: INTERNAL MEDICINE

## 2024-11-11 PROCEDURE — G2211 COMPLEX E/M VISIT ADD ON: HCPCS | Performed by: INTERNAL MEDICINE

## 2024-11-11 PROCEDURE — 1126F AMNT PAIN NOTED NONE PRSNT: CPT | Performed by: INTERNAL MEDICINE

## 2024-11-11 PROCEDURE — 90662 IIV NO PRSV INCREASED AG IM: CPT | Performed by: INTERNAL MEDICINE

## 2024-11-11 PROCEDURE — 1159F MED LIST DOCD IN RCRD: CPT | Performed by: INTERNAL MEDICINE

## 2024-11-11 PROCEDURE — 99212 OFFICE O/P EST SF 10 MIN: CPT | Performed by: INTERNAL MEDICINE

## 2024-11-11 PROCEDURE — 90739 HEPB VACC 2/4 DOSE ADULT IM: CPT | Performed by: INTERNAL MEDICINE

## 2024-11-11 PROCEDURE — 1160F RVW MEDS BY RX/DR IN RCRD: CPT | Performed by: INTERNAL MEDICINE

## 2024-11-11 ASSESSMENT — PAIN SCALES - GENERAL: PAINLEVEL_OUTOF10: 0-NO PAIN

## 2024-11-11 NOTE — PROGRESS NOTES
"Subjective   Romie Vargas \"Lewis\" is a 64 y.o. male who presents for Follow-up (Test results ).    Patient due for second hepatitis B vaccine   Patient states that he has been having stomach issues for at least 30 years, worse when he eats dairy he will become constipated.  Lactose intolerant.  Avoids dairy products and fried food.          Review of Systems   All other systems reviewed and are negative.      Objective   /64   Pulse 79   Temp 36.6 °C (97.8 °F)   Resp 16   Ht 1.702 m (5' 7\")   Wt 75.8 kg (167 lb)   SpO2 98%   BMI 26.16 kg/m²       Physical Exam  Constitutional:       Appearance: Normal appearance.   HENT:      Head: Normocephalic.   Eyes:      Conjunctiva/sclera: Conjunctivae normal.   Cardiovascular:      Rate and Rhythm: Normal rate and regular rhythm.      Heart sounds: Normal heart sounds.   Pulmonary:      Effort: Pulmonary effort is normal.      Breath sounds: Normal breath sounds.   Musculoskeletal:      Cervical back: Neck supple.   Skin:     General: Skin is warm and dry.   Neurological:      Mental Status: He is alert.         Assessment & Plan  Mixed hyperlipidemia         Cigarette nicotine dependence without complication    Orders:    CT lung screening low dose; Future    Nicotine dependence, cigarettes, uncomplicated  Down to 10 per day.  The patches weren't strong enough..         Elevated coronary artery calcium score  9/15/2021 Score of 1145       Bipolar affective disorder, current episode mixed, current episode severity unspecified (Multi)  Stable with Depakote       Immunization due    Orders:    Hepatitis B vaccine, 18yrs and older (HEPLISAV)    Flu vaccine, trivalent, preservative free, HIGH-DOSE, age 65y+ (Fluzone)         Nemesio Beasley,    "

## 2024-11-23 DIAGNOSIS — F31.9 BIPOLAR DISORDER, UNSPECIFIED (MULTI): ICD-10-CM

## 2024-11-25 RX ORDER — DIVALPROEX SODIUM 500 MG/1
500 TABLET, DELAYED RELEASE ORAL 2 TIMES DAILY
Qty: 180 TABLET | Refills: 3 | Status: SHIPPED | OUTPATIENT
Start: 2024-11-25

## 2025-01-25 DIAGNOSIS — E78.5 HYPERLIPIDEMIA, UNSPECIFIED: ICD-10-CM

## 2025-01-27 RX ORDER — ATORVASTATIN CALCIUM 80 MG/1
TABLET, FILM COATED ORAL
Qty: 90 TABLET | Refills: 3 | Status: SHIPPED | OUTPATIENT
Start: 2025-01-27

## 2025-01-31 ENCOUNTER — OFFICE VISIT (OUTPATIENT)
Dept: URGENT CARE | Age: 65
End: 2025-01-31
Payer: MEDICARE

## 2025-01-31 VITALS
OXYGEN SATURATION: 97 % | DIASTOLIC BLOOD PRESSURE: 78 MMHG | HEART RATE: 90 BPM | SYSTOLIC BLOOD PRESSURE: 110 MMHG | TEMPERATURE: 97.5 F | RESPIRATION RATE: 16 BRPM

## 2025-01-31 DIAGNOSIS — J01.00 ACUTE MAXILLARY SINUSITIS, RECURRENCE NOT SPECIFIED: Primary | ICD-10-CM

## 2025-01-31 RX ORDER — AMOXICILLIN AND CLAVULANATE POTASSIUM 875; 125 MG/1; MG/1
875 TABLET, FILM COATED ORAL 2 TIMES DAILY
Qty: 14 TABLET | Refills: 0 | Status: SHIPPED | OUTPATIENT
Start: 2025-01-31 | End: 2025-02-07

## 2025-01-31 NOTE — PROGRESS NOTES
"Subjective   Patient ID: Romie Vargas \"Shiv" is a 64 y.o. male. They present today with a chief complaint of Nasal Congestion, face pressure, and sore gums (2 weeks).    History of Present Illness  HPI    64-year-old patient presents to clinic with complaints of right sided sinus pain with associated maxillary jaw pain, sinus congestion, right ear pain, headache, chills, dizziness with head movements, nausea on the right ongoing for the past 2 weeks.  Reports has tried sinus medications and Orajel without relief.  Hot water has helped a little. Denies shortness of breath, chest pain, fevers, body aches, vomiting, abdominal pain, diarrhea.     Past Medical History  Allergies as of 01/31/2025    (No Known Allergies)       (Not in a hospital admission)       Past Medical History:   Diagnosis Date    Bipolar disorder, unspecified (Multi) 02/23/2022    Bipolar disorder (manic depression)    Bipolar disorder, unspecified (Multi) 02/23/2022    Bipolar disorder (manic depression)    Bipolar disorder, unspecified (Multi) 02/23/2022    Bipolar disorder (manic depression)    Cluster headache syndrome, unspecified, not intractable 06/03/2021    Cluster headache    Glaucoma secondary to eye trauma, left eye, stage unspecified 06/16/2021    Angle recession glaucoma of left eye    Glaucoma secondary to eye trauma, unspecified eye, stage unspecified 02/25/2021    Traumatic glaucoma    Numbness of face 01/20/2023    Personal history of other diseases of the nervous system and sense organs 07/22/2019    History of central retinal vein occlusion    Unspecified glaucoma 02/25/2021    Glaucoma    Unspecified glaucoma 02/25/2021    Glaucoma    Unspecified osteoarthritis, unspecified site 01/13/2021    Arthritis       Past Surgical History:   Procedure Laterality Date    OTHER SURGICAL HISTORY  07/22/2019    Cataract surgery    OTHER SURGICAL HISTORY  07/22/2019    Eye surgery    OTHER SURGICAL HISTORY  10/20/2021    Cholecystectomy "        reports that he has been smoking cigarettes. He has never used smokeless tobacco. He reports that he does not currently use alcohol. He reports that he does not currently use drugs after having used the following drugs: Cocaine.    Review of Systems  Review of Systems       ROS negative with the exception as noted on HPI                         Objective    Vitals:    01/31/25 1002   BP: (!) 126/96   Pulse: 90   Resp: 16   Temp: 36.4 °C (97.5 °F)   SpO2: 97%     No LMP for male patient.    Physical Exam  Constitutional:       Appearance: Normal appearance.   HENT:      Head: Normocephalic and atraumatic.      Right Ear: Tympanic membrane, ear canal and external ear normal.      Left Ear: Tympanic membrane, ear canal and external ear normal.      Nose: Mucosal edema (and erythema. worse on the right than the left) present. No rhinorrhea.      Right Sinus: Maxillary sinus tenderness and frontal sinus tenderness present.      Left Sinus: No maxillary sinus tenderness or frontal sinus tenderness.      Mouth/Throat:      Lips: Pink.      Mouth: Mucous membranes are moist. No oral lesions.      Dentition: Normal dentition. No gingival swelling.      Tongue: No lesions. Tongue does not deviate from midline.      Palate: No mass and lesions.      Pharynx: Postnasal drip present. No pharyngeal swelling, posterior oropharyngeal erythema or uvula swelling.   Cardiovascular:      Rate and Rhythm: Normal rate and regular rhythm.      Heart sounds: No murmur heard.  Pulmonary:      Effort: Pulmonary effort is normal. No respiratory distress.      Breath sounds: Normal breath sounds. No stridor. No wheezing, rhonchi or rales.   Lymphadenopathy:      Cervical: Cervical adenopathy present.   Neurological:      Mental Status: He is alert.         Procedures    Point of Care Test & Imaging Results from this visit  No results found for this visit on 01/31/25.   No results found.    Diagnostic study results (if any) were reviewed  by Margareth Hammer PA-C.    Assessment/Plan   Allergies, medications, history, and pertinent labs/EKGs/Imaging reviewed by Margareth Hammer PA-C.   right sided sinus pain with associated maxillary jaw pain, sinus congestion, right ear pain, headache, chills, dizziness with head movements, nausea on the right ongoing for the past 2 weeks.  Augmentin started.  Advised to drink plenty of fluids, run a cool-mist humidifier in room at night, and get plenty of rest. Patient should avoid over-exertion and reduce exposure to irritants such as smoke, cold, dry air, and dust. Patient may take acetaminophen or ibuprofen as directed to reduce fever, pain, chills, and body aches. Antihistamine and decongestant usage was discussed and recommendations made. May also try warm compresses over the sinuses. Risk, benefits, and potential side effects of medication(s) discussed with pt. Discussed disease/illness presentation, treatment options, progression, complications, and outcomes with patient. Pt. Has expressed understanding and is an agreement of plan of care.      Medical Decision Making      Orders and Diagnoses  There are no diagnoses linked to this encounter.    Medical Admin Record      Patient disposition: Home    Electronically signed by Margareth Hammer PA-C  10:05 AM

## 2025-03-26 ENCOUNTER — APPOINTMENT (OUTPATIENT)
Dept: PRIMARY CARE | Facility: CLINIC | Age: 65
End: 2025-03-26
Payer: MEDICARE

## 2025-03-26 VITALS
OXYGEN SATURATION: 98 % | SYSTOLIC BLOOD PRESSURE: 112 MMHG | DIASTOLIC BLOOD PRESSURE: 70 MMHG | RESPIRATION RATE: 16 BRPM | BODY MASS INDEX: 26.21 KG/M2 | HEIGHT: 67 IN | WEIGHT: 167 LBS | HEART RATE: 74 BPM

## 2025-03-26 DIAGNOSIS — F31.60 BIPOLAR AFFECTIVE DISORDER, CURRENT EPISODE MIXED, CURRENT EPISODE SEVERITY UNSPECIFIED (MULTI): ICD-10-CM

## 2025-03-26 DIAGNOSIS — N40.1 BENIGN PROSTATIC HYPERPLASIA WITH URINARY FREQUENCY: ICD-10-CM

## 2025-03-26 DIAGNOSIS — E78.2 MIXED HYPERLIPIDEMIA: ICD-10-CM

## 2025-03-26 DIAGNOSIS — J43.9 PULMONARY EMPHYSEMA, UNSPECIFIED EMPHYSEMA TYPE (MULTI): ICD-10-CM

## 2025-03-26 DIAGNOSIS — F17.210 NICOTINE DEPENDENCE, CIGARETTES, UNCOMPLICATED: Primary | ICD-10-CM

## 2025-03-26 DIAGNOSIS — R93.1 ELEVATED CORONARY ARTERY CALCIUM SCORE: ICD-10-CM

## 2025-03-26 DIAGNOSIS — Z86.718 HISTORY OF DEEP VEIN THROMBOSIS OF LOWER EXTREMITY: ICD-10-CM

## 2025-03-26 DIAGNOSIS — K59.04 CHRONIC IDIOPATHIC CONSTIPATION: ICD-10-CM

## 2025-03-26 DIAGNOSIS — R35.0 BENIGN PROSTATIC HYPERPLASIA WITH URINARY FREQUENCY: ICD-10-CM

## 2025-03-26 DIAGNOSIS — I73.00 RAYNAUD'S PHENOMENON WITHOUT GANGRENE: ICD-10-CM

## 2025-03-26 DIAGNOSIS — F17.200 TOBACCO DEPENDENCY: ICD-10-CM

## 2025-03-26 PROCEDURE — 99214 OFFICE O/P EST MOD 30 MIN: CPT | Performed by: INTERNAL MEDICINE

## 2025-03-26 PROCEDURE — 1159F MED LIST DOCD IN RCRD: CPT | Performed by: INTERNAL MEDICINE

## 2025-03-26 PROCEDURE — 1160F RVW MEDS BY RX/DR IN RCRD: CPT | Performed by: INTERNAL MEDICINE

## 2025-03-26 PROCEDURE — G2211 COMPLEX E/M VISIT ADD ON: HCPCS | Performed by: INTERNAL MEDICINE

## 2025-03-26 PROCEDURE — 3008F BODY MASS INDEX DOCD: CPT | Performed by: INTERNAL MEDICINE

## 2025-03-26 PROCEDURE — 1123F ACP DISCUSS/DSCN MKR DOCD: CPT | Performed by: INTERNAL MEDICINE

## 2025-03-26 PROCEDURE — 1126F AMNT PAIN NOTED NONE PRSNT: CPT | Performed by: INTERNAL MEDICINE

## 2025-03-26 RX ORDER — BUPROPION HYDROCHLORIDE 150 MG/1
150 TABLET ORAL DAILY
Qty: 30 TABLET | Refills: 11 | Status: SHIPPED | OUTPATIENT
Start: 2025-03-26 | End: 2026-03-26

## 2025-03-26 RX ORDER — ALBUTEROL SULFATE 90 UG/1
1 INHALANT RESPIRATORY (INHALATION) ONCE
OUTPATIENT
Start: 2025-03-26

## 2025-03-26 RX ORDER — ALBUTEROL SULFATE 0.83 MG/ML
3 SOLUTION RESPIRATORY (INHALATION) ONCE
OUTPATIENT
Start: 2025-03-26 | End: 2025-03-26

## 2025-03-26 ASSESSMENT — ENCOUNTER SYMPTOMS
PALPITATIONS: 0
TROUBLE SWALLOWING: 0
SORE THROAT: 0
ABDOMINAL PAIN: 0
CHILLS: 0
SHORTNESS OF BREATH: 0
FEVER: 0

## 2025-03-26 ASSESSMENT — PAIN SCALES - GENERAL: PAINLEVEL_OUTOF10: 0-NO PAIN

## 2025-03-26 ASSESSMENT — PATIENT HEALTH QUESTIONNAIRE - PHQ9
1. LITTLE INTEREST OR PLEASURE IN DOING THINGS: NOT AT ALL
2. FEELING DOWN, DEPRESSED OR HOPELESS: NOT AT ALL
SUM OF ALL RESPONSES TO PHQ9 QUESTIONS 1 AND 2: 0

## 2025-03-26 NOTE — PROGRESS NOTES
"Subjective   Romie Vargas \"Lewis\" is a 64 y.o. male who presents for Follow-up constipation/ stomach issues     Stomach issues with constipation have improved, watching what he eats  Does not eat any cheese or dairy which constipates him.    No issues or concerns today  Patient has had 2 falls in the last year, one fall he slipped on ice and other he fell in the shower while reaching for a towel.          Review of Systems   Constitutional:  Negative for chills and fever.   HENT:  Negative for sore throat and trouble swallowing.    Respiratory:  Negative for shortness of breath.    Cardiovascular:  Negative for chest pain, palpitations and leg swelling.   Gastrointestinal:  Negative for abdominal pain.   Skin:  Negative for rash.   All other systems reviewed and are negative.      Objective   /70   Pulse 74   Resp 16   Ht 1.702 m (5' 7\")   Wt 75.8 kg (167 lb)   SpO2 98%   BMI 26.16 kg/m²       Physical Exam  Constitutional:       Appearance: Normal appearance.   HENT:      Head: Normocephalic.   Eyes:      Conjunctiva/sclera: Conjunctivae normal.   Cardiovascular:      Rate and Rhythm: Normal rate and regular rhythm.      Heart sounds: Normal heart sounds.   Pulmonary:      Effort: Pulmonary effort is normal.      Breath sounds: Normal breath sounds.   Musculoskeletal:      Cervical back: Neck supple.   Skin:     General: Skin is warm and dry.   Neurological:      Mental Status: He is alert.         Assessment & Plan  Nicotine dependence, cigarettes, uncomplicated  Tobacco Counseling  The patient smokes cigarettes and desires to quit.  We created the following quit plan:  Prescribed the following medications: bupropion.  Orders:    buPROPion XL (Wellbutrin XL) 150 mg 24 hr tablet; Take 1 tablet (150 mg) by mouth once daily. Do not crush, chew, or split.    Bipolar affective disorder, current episode mixed, current episode severity unspecified (Multi)  Discussed treatment options including medication, " therapy or both. Discussed risks and benefits of SSRI, Norepinephrine and dopamine medications  including increased risk of suicide, weight gain, lowered seizure threshold. Reviewed how these medications are taken, duration, delay in response and side effects including Nausea, Vomiting, Diarrhea, Headache, dizziness, change in appetite, sexual side effects, as well as reviewed serotonin syndrome, especially in higher doses or with multiple serotonin agents.     The risks of taking these medications with bipolar disorder were discussed which includes impairment of memory and cognition, increased falls, addiction and dangerous withdrawals if stopped suddenly which can cause significant morbidity and or mortality. Patient is fully aware of his risks and agrees to call should elisabeth recur.    Orders:    buPROPion XL (Wellbutrin XL) 150 mg 24 hr tablet; Take 1 tablet (150 mg) by mouth once daily. Do not crush, chew, or split.    Benign prostatic hyperplasia with urinary frequency         Elevated coronary artery calcium score         Raynaud's phenomenon without gangrene         Chronic idiopathic constipation         Mixed hyperlipidemia         History of deep vein thrombosis of lower extremity         Pulmonary emphysema, unspecified emphysema type (Multi)    Orders:    Complete Pulmonary Function Test Pre/Post Bronchodilator (Spirometry Pre/Post/DLCO/Lung Volumes); Future    albuterol 2.5 mg /3 mL (0.083 %) nebulizer solution 3 mL    albuterol 90 mcg/actuation inhaler 1 puff         Nemesio Beasley DO

## 2025-03-26 NOTE — PATIENT INSTRUCTIONS
Quitting Smoking    Quitting smoking is the most important step you can take to improve your health. We're glad you have set a goal to improve your health.    Quit Smoking Resources    In addition to medications, use the STAR plan to help you successfully quit.   Stick with your quit date!   Tell friends, family, and coworkers your quit date. Request their understanding and support.  Anticipate and prepare for challenges. Some examples are withdrawal symptoms, being around others who smoke, and drinking alcohol.  Remove all tobacco products and paraphernalia from your environment. Make your home and vehicles smoke-free.    Free resources for additional support:  National tobacco quitline: 1-800-QUIT-NOW (1-854.710.1920).  SmokefreeTXT is a free text program to assist you in quitting. Visit https://www.smokefree.gov/smokefreetxt for more information.  Feel free to call your care manager at ([phone]) for additional support.

## 2025-03-26 NOTE — ASSESSMENT & PLAN NOTE
Tobacco Counseling  The patient smokes cigarettes and desires to quit.  We created the following quit plan:  Prescribed the following medications: bupropion.  Orders:    buPROPion XL (Wellbutrin XL) 150 mg 24 hr tablet; Take 1 tablet (150 mg) by mouth once daily. Do not crush, chew, or split.

## 2025-03-26 NOTE — ASSESSMENT & PLAN NOTE
Discussed treatment options including medication, therapy or both. Discussed risks and benefits of SSRI, Norepinephrine and dopamine medications  including increased risk of suicide, weight gain, lowered seizure threshold. Reviewed how these medications are taken, duration, delay in response and side effects including Nausea, Vomiting, Diarrhea, Headache, dizziness, change in appetite, sexual side effects, as well as reviewed serotonin syndrome, especially in higher doses or with multiple serotonin agents.     The risks of taking these medications with bipolar disorder were discussed which includes impairment of memory and cognition, increased falls, addiction and dangerous withdrawals if stopped suddenly which can cause significant morbidity and or mortality. Patient is fully aware of his risks and agrees to call should elisabeth recur.    Orders:    buPROPion XL (Wellbutrin XL) 150 mg 24 hr tablet; Take 1 tablet (150 mg) by mouth once daily. Do not crush, chew, or split.

## 2025-04-08 ENCOUNTER — APPOINTMENT (OUTPATIENT)
Dept: RESPIRATORY THERAPY | Facility: HOSPITAL | Age: 65
End: 2025-04-08
Payer: MEDICARE

## 2025-04-15 ENCOUNTER — APPOINTMENT (OUTPATIENT)
Dept: CARDIOLOGY | Facility: CLINIC | Age: 65
End: 2025-04-15
Payer: COMMERCIAL

## 2025-04-15 VITALS
DIASTOLIC BLOOD PRESSURE: 72 MMHG | WEIGHT: 166 LBS | HEIGHT: 67 IN | SYSTOLIC BLOOD PRESSURE: 124 MMHG | HEART RATE: 73 BPM | BODY MASS INDEX: 26.06 KG/M2 | OXYGEN SATURATION: 97 %

## 2025-04-15 DIAGNOSIS — R94.31 ABNORMAL EKG: ICD-10-CM

## 2025-04-15 DIAGNOSIS — R93.1 ELEVATED CORONARY ARTERY CALCIUM SCORE: Primary | ICD-10-CM

## 2025-04-15 DIAGNOSIS — E78.5 DYSLIPIDEMIA: ICD-10-CM

## 2025-04-15 PROCEDURE — 93005 ELECTROCARDIOGRAM TRACING: CPT | Performed by: INTERNAL MEDICINE

## 2025-04-15 PROCEDURE — 1123F ACP DISCUSS/DSCN MKR DOCD: CPT | Performed by: INTERNAL MEDICINE

## 2025-04-15 PROCEDURE — 1159F MED LIST DOCD IN RCRD: CPT | Performed by: INTERNAL MEDICINE

## 2025-04-15 PROCEDURE — 99214 OFFICE O/P EST MOD 30 MIN: CPT | Performed by: INTERNAL MEDICINE

## 2025-04-15 PROCEDURE — 99214 OFFICE O/P EST MOD 30 MIN: CPT | Mod: 25 | Performed by: INTERNAL MEDICINE

## 2025-04-15 PROCEDURE — 3008F BODY MASS INDEX DOCD: CPT | Performed by: INTERNAL MEDICINE

## 2025-04-15 PROCEDURE — 93010 ELECTROCARDIOGRAM REPORT: CPT | Performed by: INTERNAL MEDICINE

## 2025-04-15 PROCEDURE — 1160F RVW MEDS BY RX/DR IN RCRD: CPT | Performed by: INTERNAL MEDICINE

## 2025-04-15 NOTE — PROGRESS NOTES
"Chief Complaint:   No chief complaint on file.     History Of Present Illness:    Romie Vargas \"Shiv" is a 64 y.o. male with a history of dyslipidemia, abnormal ECG (nonspecific inferolateral ST/T changes), and elevated coronary calcium score here for follow-up.     He continues to walk every day. Denies any exertional CP or SOB.    Is still smoking about 1/2 pack of cigarettes daily.  Is still trying to cut down. Tried Wellbutrin but made him upset and he stopped Wellbutrin.    Lexiscan nuclear stress test 5/22/2023: No evidence of ischemia or scarring.  EF 62%.     CT coronary calcium score 9/17/2021: Total score 1145.   LM 17    LCx 4.5        Past Medical History:  He has a past medical history of Bipolar disorder, unspecified (Multi) (02/23/2022), Bipolar disorder, unspecified (Multi) (02/23/2022), Bipolar disorder, unspecified (Multi) (02/23/2022), Cluster headache syndrome, unspecified, not intractable (06/03/2021), Glaucoma secondary to eye trauma, left eye, stage unspecified (06/16/2021), Glaucoma secondary to eye trauma, unspecified eye, stage unspecified (02/25/2021), Numbness of face (01/20/2023), Personal history of other diseases of the nervous system and sense organs (07/22/2019), Unspecified glaucoma (02/25/2021), Unspecified glaucoma (02/25/2021), and Unspecified osteoarthritis, unspecified site (01/13/2021).    Past Surgical History:  He has a past surgical history that includes Other surgical history (07/22/2019); Other surgical history (07/22/2019); and Other surgical history (10/20/2021).      Social History:  He reports that he has been smoking cigarettes. He has never used smokeless tobacco. He reports that he does not currently use alcohol. He reports that he does not currently use drugs after having used the following drugs: Cocaine.    Family History:  Family History   Problem Relation Name Age of Onset    Other (diabetes mellitus) Mother      Dementia Father      Other " "(cardiac disorder) Father      Other (malignant neoplasm of stomach) Father          Allergies:  Patient has no known allergies.    Outpatient Medications:  Current Outpatient Medications   Medication Instructions    atorvastatin (Lipitor) 80 mg tablet TAKE 1 TABLET BY MOUTH EVERY DAY    brimonidine (AlphaGAN P) 0.15 % ophthalmic solution 1 drop, 2 times daily    buPROPion XL (WELLBUTRIN XL) 150 mg, oral, Daily, Do not crush, chew, or split.    CALCIUM POLYCARBOPHIL ORAL Fiber TABS   Refills: 0       Active    divalproex (DEPAKOTE) 500 mg, oral, 2 times daily    multivitamin tablet Multi Vitamin Daily TABS   Refills: 0       Active    timolol (Timoptic) 0.5 % ophthalmic solution 1 drop       Last Recorded Vitals:  Visit Vitals  /72 (BP Location: Left arm, Patient Position: Sitting)   Pulse 73   Ht 1.702 m (5' 7\")   Wt 75.3 kg (166 lb)   SpO2 97%   BMI 26.00 kg/m²   Smoking Status Every Day   BSA 1.89 m²      LASTWT(3):   Wt Readings from Last 3 Encounters:   04/15/25 75.3 kg (166 lb)   03/26/25 75.8 kg (167 lb)   11/11/24 75.8 kg (167 lb)       Physical Exam:  In general: alert and in no acute distress.   HEENT: Carotid upstrokes normal with no bruits. JVP is normal.   Pulmonary: Clear to auscultation bilaterally.  Cardiovascular: S1,S2, regular. No appreciable murmurs, rubs or gallops.   Lower extremities: Warm. 2+ distal pulses. No edema.     Last Labs:  CBC -  Recent Labs     09/03/24  1005 08/16/23  1004 08/08/22  1027   WBC 7.9 8.7 7.5   HGB 15.5 15.5 15.7   HCT 44.6 44.9 46.6    254 282   MCV 91 90 92       CMP -  Recent Labs     09/03/24  1005 08/16/23  1004 08/08/22  1027    136 139   K 4.7 4.3 4.6    101 104   CO2 28 28 28   ANIONGAP 11 11 12   BUN 12 12 18   CREATININE 0.97 1.03 1.22   EGFR 87  --   --      Recent Labs     09/03/24  1005 08/16/23  1004 08/08/22  1027   ALBUMIN 4.2 4.2 4.3   ALKPHOS 72 68 60   ALT 27 26 33   AST 22 21 29   BILITOT 0.5 0.6 0.5       LIPID PANEL - " "  Recent Labs     09/03/24  1005 08/16/23  1004 08/08/22  1027 08/11/21  0907   CHOL 141 129 122 130   LDLCALC 74  --   --   --    LDLF  --  67 62 69   HDL 48.6 44.5 46.0 42.0   TRIG 92 89 71 94       No results for input(s): \"BNP\", \"HGBA1C\" in the last 13333 hours.        Assessment/Plan   1) elevated coronary calcium score: Nuclear stress test May 2023 (performed because of inferolateral T wave inversions) demonstrated low likelihood of flow-limiting coronary disease.  No signs or symptoms to suggest obstructive coronary disease.  Continue with lifestyle and risk factor modification.     2) tobacco abuse: Encourage him to try cutting back on his cigarette use and to speak to his PCP about other options.    3) dyslipidemia: Goal LDL less than 70.  Has historically been at that level.  Most recent LDL was mildly elevated.  Would recommend continuing the current dose of atorvastatin 80 mg daily and if next LDL is above 70 then would consider the addition of ezetimibe 10 mg daily.     4) abnormal ECG: No significant change compared to prior ECGs.     5) follow-up: 12 months or sooner if needed.       Spencer Kemp MD  "

## 2025-05-05 ENCOUNTER — APPOINTMENT (OUTPATIENT)
Dept: PRIMARY CARE | Facility: CLINIC | Age: 65
End: 2025-05-05
Payer: MEDICARE

## 2025-05-05 VITALS
SYSTOLIC BLOOD PRESSURE: 126 MMHG | WEIGHT: 167 LBS | OXYGEN SATURATION: 98 % | BODY MASS INDEX: 26.21 KG/M2 | HEIGHT: 67 IN | RESPIRATION RATE: 16 BRPM | DIASTOLIC BLOOD PRESSURE: 74 MMHG | HEART RATE: 76 BPM

## 2025-05-05 DIAGNOSIS — F32.A ANXIETY AND DEPRESSION: Primary | ICD-10-CM

## 2025-05-05 DIAGNOSIS — F17.210 NICOTINE DEPENDENCE, CIGARETTES, UNCOMPLICATED: ICD-10-CM

## 2025-05-05 DIAGNOSIS — D49.0 IPMN (INTRADUCTAL PAPILLARY MUCINOUS NEOPLASM): Primary | ICD-10-CM

## 2025-05-05 DIAGNOSIS — F41.9 ANXIETY AND DEPRESSION: Primary | ICD-10-CM

## 2025-05-05 DIAGNOSIS — F31.60 BIPOLAR AFFECTIVE DISORDER, CURRENT EPISODE MIXED, CURRENT EPISODE SEVERITY UNSPECIFIED (MULTI): ICD-10-CM

## 2025-05-05 PROCEDURE — 99213 OFFICE O/P EST LOW 20 MIN: CPT | Performed by: INTERNAL MEDICINE

## 2025-05-05 PROCEDURE — 3008F BODY MASS INDEX DOCD: CPT | Performed by: INTERNAL MEDICINE

## 2025-05-05 PROCEDURE — 1126F AMNT PAIN NOTED NONE PRSNT: CPT | Performed by: INTERNAL MEDICINE

## 2025-05-05 PROCEDURE — G2211 COMPLEX E/M VISIT ADD ON: HCPCS | Performed by: INTERNAL MEDICINE

## 2025-05-05 PROCEDURE — 1123F ACP DISCUSS/DSCN MKR DOCD: CPT | Performed by: INTERNAL MEDICINE

## 2025-05-05 ASSESSMENT — ENCOUNTER SYMPTOMS
SORE THROAT: 0
SHORTNESS OF BREATH: 0
ABDOMINAL PAIN: 0
TROUBLE SWALLOWING: 0
PALPITATIONS: 0
FEVER: 0
CHILLS: 0

## 2025-05-05 ASSESSMENT — PATIENT HEALTH QUESTIONNAIRE - PHQ9
1. LITTLE INTEREST OR PLEASURE IN DOING THINGS: NOT AT ALL
SUM OF ALL RESPONSES TO PHQ9 QUESTIONS 1 AND 2: 0
2. FEELING DOWN, DEPRESSED OR HOPELESS: NOT AT ALL

## 2025-05-05 ASSESSMENT — PAIN SCALES - GENERAL: PAINLEVEL_OUTOF10: 0-NO PAIN

## 2025-05-05 NOTE — ASSESSMENT & PLAN NOTE
Discussed treatment options including medication, therapy or both. Discussed risks and benefits of SSRI, Norepinephrine and dopamine medications  including increased risk of suicide, weight gain, lowered seizure threshold. Reviewed how these medications are taken, duration, delay in response and side effects including Nausea, Vomiting, Diarrhea, Headache, dizziness, change in appetite, sexual side effects, as well as reviewed serotonin syndrome, especially in higher doses or with multiple serotonin agents.     The risks of taking these medications with bipolar disorder were discussed which includes impairment of memory and cognition, increased falls, addiction and dangerous withdrawals if stopped suddenly which can cause significant morbidity and or mortality. Patient is fully aware of his risks and agrees to call should elisabeth recur.      Orders:    Referral to Clinical Pharmacy; Future

## 2025-05-05 NOTE — PROGRESS NOTES
"Subjective   Romie Vargas \"Lewis\" is a 64 y.o. male who presents for Follow-up (Medication ).      Patient reports that he took the Wellbutrin for 3-4 days and stopped due to it making him feel like he was \"high\"   Reports that at least once a month he gets constipated, states that it is from years and years of drinking   CT lung is scheduled for 9.8.2025, and MRI in June for a cyst that is being watched       History of Present Illness  The patient presents for a follow-up on his medications.    Negative Experience with Wellbutrin  He reports a negative experience with Wellbutrin, which he took for 3 to 4 days before discontinuing due to unpleasant side effects. He describes the sensation as akin to being under the influence of marijuana, leading to concerns about potential addiction. He has a history of substance abuse but has been abstinent for the past 18.5 years. He is currently on Depakote, which he tolerates well, although it took approximately 2 weeks for him to adjust to the medication. He reports no tremors or other adverse effects from his current medications. He was previously under the care of Dr. Sy Wilson for psychiatric issues, but his insurance no longer covers her services.  - Onset: Took Wellbutrin for 3 to 4 days.  - Character: Unpleasant side effects, sensation akin to being under the influence of marijuana.  - Alleviating/Aggravating Factors: Discontinued due to concerns about potential addiction.  - Duration: Adjusted to Depakote in approximately 2 weeks.  - Severity: No tremors or other adverse effects from current medications.    Smoking Cessation Efforts  He has successfully reduced his cigarette consumption to 9 per day, with a goal of complete cessation. He has previously consulted with Dr. Lerner, a cardiologist, who advised him to further reduce his smoking by one cigarette. He has also tried nicotine gum but found the taste unpalatable. He has not tried Chantix due to concerns about " "potential side effects. He has previously attempted to use nicotine patches but discontinued their use due to vivid dreams, which he attributes to incorrect usage.  - Onset: Ongoing efforts to reduce smoking.  - Character: Reduced cigarette consumption to 9 per day.  - Alleviating/Aggravating Factors: Nicotine gum (unpalatable taste), concerns about Chantix side effects, vivid dreams from nicotine patches.  - Severity: Goal of complete cessation.    Monitoring of Pancreatic Cyst and Lung Nodule  He has been informed by Dr. Jackelyn Garcia that he is due for his biennial MRI scan in 06/2025 to monitor a pancreatic cyst. The cyst, measuring 1 mm, has remained stable since its discovery. He expresses discomfort with the contrast used in these scans and prefers to have them conducted every few years. He also has a lung nodule, which will be reassessed in the fall of 2025. He reports no chest pain or shortness of breath and has increased his walking duration from 30-40 minutes to nearly 1.5 hours.  - Onset: Pancreatic cyst discovered previously, stable since discovery.  - Character: Pancreatic cyst measuring 1 mm, lung nodule.  - Alleviating/Aggravating Factors: Discomfort with contrast used in MRI scans.  - Timing: Biennial MRI scan due in 06/2025, lung nodule reassessment in fall of 2025.  - Severity: No chest pain or shortness of breath.    SOCIAL HISTORY  The patient smokes 9 cigarettes a day.      Review of Systems   Constitutional:  Negative for chills and fever.   HENT:  Negative for sore throat and trouble swallowing.    Respiratory:  Negative for shortness of breath.    Cardiovascular:  Negative for chest pain, palpitations and leg swelling.   Gastrointestinal:  Negative for abdominal pain.   Skin:  Negative for rash.   All other systems reviewed and are negative.      Objective   /74   Pulse 76   Resp 16   Ht 1.702 m (5' 7\")   Wt 75.8 kg (167 lb)   SpO2 98%   BMI 26.16 kg/m²       Physical " Exam  Constitutional:       Appearance: Normal appearance.   HENT:      Head: Normocephalic.   Eyes:      Conjunctiva/sclera: Conjunctivae normal.   Cardiovascular:      Rate and Rhythm: Normal rate and regular rhythm.      Heart sounds: Normal heart sounds.   Pulmonary:      Effort: Pulmonary effort is normal.      Breath sounds: Normal breath sounds.   Abdominal:      General: Abdomen is flat.   Musculoskeletal:      Cervical back: Neck supple.   Skin:     General: Skin is warm and dry.   Neurological:      Mental Status: He is alert.         Assessment & Plan  Anxiety and depression    Orders:    Follow Up In [facility]; Future    Referral to Clinical Pharmacy; Future    Nicotine dependence, cigarettes, uncomplicated  Tobacco Counseling  The patient smokes cigarettes and desires to quit.  We created the following quit plan:  Prescribed the following medications: bupropion.    Orders:    Referral to Tobacco Cessation Counseling; Future    Referral to Clinical Pharmacy; Future     Bipolar affective disorder, current episode mixed, current episode severity unspecified (Multi)  Discussed treatment options including medication, therapy or both. Discussed risks and benefits of SSRI, Norepinephrine and dopamine medications  including increased risk of suicide, weight gain, lowered seizure threshold. Reviewed how these medications are taken, duration, delay in response and side effects including Nausea, Vomiting, Diarrhea, Headache, dizziness, change in appetite, sexual side effects, as well as reviewed serotonin syndrome, especially in higher doses or with multiple serotonin agents.     The risks of taking these medications with bipolar disorder were discussed which includes impairment of memory and cognition, increased falls, addiction and dangerous withdrawals if stopped suddenly which can cause significant morbidity and or mortality. Patient is fully aware of his  "risks and agrees to call should elisabeth recur.      Orders:    Referral to Clinical Pharmacy; Future        Assessment & Plan  1. Bipolar disorder: Stable.  - Reported discontinuing Wellbutrin after 3-4 days due to feeling \"high\" and concerns about potential addiction.  - Currently on Depakote, which took about two weeks to stabilize without significant side effects.  - Discussed the possibility of medications triggering manic phases and the need for careful management.  - Referral to behavioral health for further management to ensure optimal treatment and avoid triggering manic phases.    2. Smoking cessation.  - Reduced cigarette intake to nine per day and is motivated to quit entirely.  - No current use of nicotine patches or gum due to previous adverse effects and lack of efficacy.  - Referral to a smoking cessation counselor.  - Nicotine replacement therapy, such as NicoDerm patches, may be considered if needed.    3. Pancreatic cyst: Stable.  - Due for biennial MRI scan in June 2025 to monitor a pancreatic cyst.  Sent note to [facility] to send order for follow up MRI.  - The cyst, measuring 1 mm, has remained stable since its discovery.  - Expressed discomfort with the contrast used in these scans and prefers to have them conducted every few years.  - No chest pain or shortness of breath reported; increased walking duration from 30-40 minutes to nearly 1.5 hours.    Follow-up  - Follow up in 6 months.      Nemesio Beasley,    This medical note was created with the assistance of artificial intelligence (AI) for documentation purposes. The content has been reviewed and confirmed by the healthcare provider for accuracy and completeness. Patient consented to the use of audio recording and use of AI during their visit.   "

## 2025-05-05 NOTE — ASSESSMENT & PLAN NOTE
Tobacco Counseling  The patient smokes cigarettes and desires to quit.  We created the following quit plan:  Prescribed the following medications: bupropion.    Orders:    Referral to Tobacco Cessation Counseling; Future    Referral to Clinical Pharmacy; Future

## 2025-05-13 ENCOUNTER — TELEPHONE (OUTPATIENT)
Dept: PRIMARY CARE | Facility: CLINIC | Age: 65
End: 2025-05-13
Payer: MEDICARE

## 2025-05-22 ENCOUNTER — APPOINTMENT (OUTPATIENT)
Dept: PHARMACY | Facility: HOSPITAL | Age: 65
End: 2025-05-22
Payer: MEDICARE

## 2025-05-22 DIAGNOSIS — F31.60 BIPOLAR AFFECTIVE DISORDER, CURRENT EPISODE MIXED, CURRENT EPISODE SEVERITY UNSPECIFIED (MULTI): ICD-10-CM

## 2025-05-22 DIAGNOSIS — E78.5 HYPERLIPIDEMIA, UNSPECIFIED: ICD-10-CM

## 2025-05-22 DIAGNOSIS — F41.9 ANXIETY AND DEPRESSION: ICD-10-CM

## 2025-05-22 DIAGNOSIS — F17.210 NICOTINE DEPENDENCE, CIGARETTES, UNCOMPLICATED: ICD-10-CM

## 2025-05-22 DIAGNOSIS — F32.A ANXIETY AND DEPRESSION: ICD-10-CM

## 2025-05-22 DIAGNOSIS — F31.9 BIPOLAR DISORDER, UNSPECIFIED (MULTI): ICD-10-CM

## 2025-05-22 NOTE — PROGRESS NOTES
"  Clinical Pharmacy Appointment    Patient ID: Romie Vargas \"Shiv" is a 64 y.o. male who presents for Medication Problem (Costs).    Pt is here for First appointment.  Referring Provider: Nemesio Beasley DO - last visit: 5/5/25, next visit: 8/11/25  PCP: Nemesio Beasley DO     Subjective   HPI  PMH significant for Hx DVT, DLD, BPH, pancreas cyst, liver lesion, Raynauds phenomenon, glaucoma, macular edema, bipolar, nicotine dependence.  Special needs/barriers to therapy: None identified    Medication System Management  Adherence/Organization: Reviewed in detail below  Affordability/Accessibility: Reviewed in detail below  Patient's preferred pharmacy:     [facility]/pharmacy #[id number] - [place], [place] - [address] [address]  [address] [address] [address] [facility] [address]  Phone: [phone] Fax: [phone]       COMPREHENSIVE MEDICATION REVIEW  The patient's current medication regimen was reviewed and discussed. The following medication-related problems were identified and addressed:    Current Outpatient Medications   Medication Instructions    atorvastatin (Lipitor) 80 mg tablet TAKE 1 TABLET BY MOUTH EVERY DAY    brimonidine (AlphaGAN P) 0.15 % ophthalmic solution 1 drop, Both Eyes, 2 times daily    CALCIUM POLYCARBOPHIL ORAL Take 1 capsule by mouth once daily.    divalproex (DEPAKOTE) 500 mg, oral, 2 times daily    multivitamin tablet Take 1 tablet by mouth once daily.    timolol (Timoptic) 0.5 % ophthalmic solution 1 drop, Both Eyes, 2 times daily, Use as directed      Medication reconciliation:  Brimonidine instructions updated to 1 drop to both eyes twice daily   Calcium polycarbophil added instructions 1 cap once daily  Multivitamin added instructions 1 tab once daily  Timolol updated instructions to 1 drop to both eyes twice daily   Duplicate Therapies: None  Inappropriate Dose: None  Geriatric Considerations: None  Drug Interactions: None  Other Concerns: None        MEDICATION ADHERENCE  Reviewed [employer] " "dispense report and patient-reported history.  Estimated Adherence: Adherence: never misses a dose     Adherence Barrier Yes/No Comments   Understands reason medications are prescribed Yes    Understands benefit of medications Yes    Understands medication instructions Yes    Denies adverse effects Yes    Comfortable with medication formulations Yes    Comfortable with amount of pills being taken Yes    Medications are consistently affordable No High copays for brimonidine eyes drops   Patient has reliable method of receiving prescriptions Yes    Uses adherence packaging (pill box, blister packs) Yes    Uses adherence prompt (reminder, alarm, log) No        Objective   Allergies[1]  Social History     Social History Narrative    Not on file      Medication Review  Current Outpatient Medications   Medication Instructions    atorvastatin (Lipitor) 80 mg tablet TAKE 1 TABLET BY MOUTH EVERY DAY    brimonidine (AlphaGAN P) 0.15 % ophthalmic solution 1 drop, Both Eyes, 2 times daily    CALCIUM POLYCARBOPHIL ORAL Take 1 capsule by mouth once daily.    divalproex (DEPAKOTE) 500 mg, oral, 2 times daily    multivitamin tablet Take 1 tablet by mouth once daily.    timolol (Timoptic) 0.5 % ophthalmic solution 1 drop, Both Eyes, 2 times daily, Use as directed      Vitals  BP Readings from Last 2 Encounters:   05/05/25 126/74   04/15/25 124/72     Wt Readings from Last 3 Encounters:   05/05/25 75.8 kg (167 lb)   04/15/25 75.3 kg (166 lb)   03/26/25 75.8 kg (167 lb)      There is no height or weight on file to calculate BMI.  Labs  A1C  No results found for: \"HGBA1C\"  Metabolic Panel  Lab Results   Component Value Date    EGFR 87 09/03/2024    CREATININE 0.97 09/03/2024     09/03/2024    K 4.7 09/03/2024    CALCIUM 9.8 09/03/2024     09/03/2024    CO2 28 09/03/2024    BUN 12 09/03/2024     Liver function  Lab Results   Component Value Date    ALT 27 09/03/2024    AST 22 09/03/2024    ALKPHOS 72 09/03/2024    BILITOT " "0.5 09/03/2024     Lipid Panel  Lab Results   Component Value Date    CHOL 141 09/03/2024    LDLCALC 74 09/03/2024    TRIG 92 09/03/2024    HDL 48.6 09/03/2024     Urine Microalbumin  No results found for: \"MICROALBCREA\"    Assessment/Plan   Problem List Items Addressed This Visit       Bipolar disorder (manic depression) (Multi)    Nicotine dependence, cigarettes, uncomplicated     Other Visit Diagnoses         Hyperlipidemia, unspecified          Bipolar disorder, unspecified (Multi)          Anxiety and depression            The following medication-related problems were identified and addressed:  Cost barriers  Patient reports high cost of brimonidine eye drops.  Pharmacist reviewed insurance coverage and compared copays across multiple pharmacies. Determined copays for multiple medications at [facility] pharmacy are significantly higher than  and other retail pharmacies. Reviewed estimated pricing for all current medications at  Pharmacies and confirmed affordable.  Pharmacist to facilitate transfer of all current prescriptions to [facility] for free home delivery and lower copays. Patient provided with pharmacy contact info and instructions for requesting refills when needed.  Also recommended discussing Combigan (brimonidine+timolol) combination eye drop at next eye appt. Plans to return to [place] [facility] for future care and will reach out if anything further is needed.    Patient Education:  Counseled patient on relevant medication mechanisms of action, expectations, side effects, duration of therapy, contraindications, administration, and monitoring parameters.  All questions and concerns addressed. Contact pharmacist with any further questions or concerns prior to next appointment.    Clinical Pharmacist follow-up: As needed  Patient is not followed in [facility].    Thank you,  Barbara Walden, [facility]  Clinical Pharmacy Specialist  [phone]    Continue all meds under the continuation of care with " the referring provider and clinical pharmacy team.  Verbal consent to manage patient's drug therapy was obtained from the patient. They were informed they may decline to participate or withdraw from participation in pharmacy services at any time.         [1] No Known Allergies

## 2025-08-11 ENCOUNTER — APPOINTMENT (OUTPATIENT)
Dept: PRIMARY CARE | Facility: CLINIC | Age: 65
End: 2025-08-11
Payer: MEDICARE

## 2025-08-11 VITALS
DIASTOLIC BLOOD PRESSURE: 64 MMHG | HEART RATE: 80 BPM | RESPIRATION RATE: 16 BRPM | OXYGEN SATURATION: 97 % | WEIGHT: 159 LBS | BODY MASS INDEX: 24.96 KG/M2 | SYSTOLIC BLOOD PRESSURE: 110 MMHG | HEIGHT: 67 IN

## 2025-08-11 DIAGNOSIS — H16.223 KERATOCONJUNCTIVITIS SICCA OF BOTH EYES NOT DUE TO SJOGREN'S SYNDROME: ICD-10-CM

## 2025-08-11 DIAGNOSIS — Z86.718 HISTORY OF DEEP VEIN THROMBOSIS OF LOWER EXTREMITY: ICD-10-CM

## 2025-08-11 DIAGNOSIS — R23.8 ABNORMAL SKIN COLOR: ICD-10-CM

## 2025-08-11 DIAGNOSIS — H52.13 MYOPIA WITH PRESBYOPIA OF BOTH EYES: ICD-10-CM

## 2025-08-11 DIAGNOSIS — N40.1 BENIGN PROSTATIC HYPERPLASIA WITH URINARY FREQUENCY: ICD-10-CM

## 2025-08-11 DIAGNOSIS — R35.0 BENIGN PROSTATIC HYPERPLASIA WITH URINARY FREQUENCY: ICD-10-CM

## 2025-08-11 DIAGNOSIS — Z96.1 BILATERAL PSEUDOPHAKIA: ICD-10-CM

## 2025-08-11 DIAGNOSIS — Z13.6 ENCOUNTER FOR ABDOMINAL AORTIC ANEURYSM (AAA) SCREENING: ICD-10-CM

## 2025-08-11 DIAGNOSIS — Z00.00 WELL ADULT HEALTH CHECK: ICD-10-CM

## 2025-08-11 DIAGNOSIS — R63.4 WEIGHT LOSS: ICD-10-CM

## 2025-08-11 DIAGNOSIS — F17.210 NICOTINE DEPENDENCE, CIGARETTES, UNCOMPLICATED: ICD-10-CM

## 2025-08-11 DIAGNOSIS — F31.62 BIPOLAR DISORDER, CURRENT EPISODE MIXED, MODERATE (MULTI): ICD-10-CM

## 2025-08-11 DIAGNOSIS — F31.9 BIPOLAR DISORDER, UNSPECIFIED (MULTI): ICD-10-CM

## 2025-08-11 DIAGNOSIS — R93.1 ELEVATED CORONARY ARTERY CALCIUM SCORE: ICD-10-CM

## 2025-08-11 DIAGNOSIS — Z23 IMMUNIZATION DUE: ICD-10-CM

## 2025-08-11 DIAGNOSIS — E78.5 HYPERLIPIDEMIA, UNSPECIFIED: ICD-10-CM

## 2025-08-11 DIAGNOSIS — Z12.5 ENCOUNTER FOR PROSTATE CANCER SCREENING: ICD-10-CM

## 2025-08-11 DIAGNOSIS — R73.01 ELEVATED FASTING GLUCOSE: ICD-10-CM

## 2025-08-11 DIAGNOSIS — L57.0 ACTINIC KERATOSIS: ICD-10-CM

## 2025-08-11 DIAGNOSIS — Z98.83 STATUS POST GLAUCOMA SURGERY: ICD-10-CM

## 2025-08-11 DIAGNOSIS — H40.9 GLAUCOMA OF BOTH EYES, UNSPECIFIED GLAUCOMA TYPE: ICD-10-CM

## 2025-08-11 DIAGNOSIS — H52.4 MYOPIA WITH PRESBYOPIA OF BOTH EYES: ICD-10-CM

## 2025-08-11 DIAGNOSIS — Z00.00 ROUTINE GENERAL MEDICAL EXAMINATION AT HEALTH CARE FACILITY: Primary | ICD-10-CM

## 2025-08-11 RX ORDER — DIVALPROEX SODIUM 500 MG/1
500 TABLET, DELAYED RELEASE ORAL 2 TIMES DAILY
Qty: 180 TABLET | Refills: 3 | Status: SHIPPED | OUTPATIENT
Start: 2025-08-11

## 2025-08-11 RX ORDER — ATORVASTATIN CALCIUM 80 MG/1
TABLET, FILM COATED ORAL
Qty: 90 TABLET | Refills: 3 | Status: SHIPPED | OUTPATIENT
Start: 2025-08-11

## 2025-08-11 ASSESSMENT — ACTIVITIES OF DAILY LIVING (ADL)
MANAGING_FINANCES: NEEDS ASSISTANCE
GROCERY_SHOPPING: NEEDS ASSISTANCE
DRESSING: INDEPENDENT
DOING_HOUSEWORK: INDEPENDENT
BATHING: INDEPENDENT
TAKING_MEDICATION: INDEPENDENT